# Patient Record
Sex: FEMALE | Race: WHITE | Employment: UNEMPLOYED | URBAN - METROPOLITAN AREA
[De-identification: names, ages, dates, MRNs, and addresses within clinical notes are randomized per-mention and may not be internally consistent; named-entity substitution may affect disease eponyms.]

---

## 2018-11-23 ENCOUNTER — HOSPITAL ENCOUNTER (OUTPATIENT)
Age: 58
Discharge: HOME | End: 2018-11-23
Attending: INTERNAL MEDICINE | Admitting: INTERNAL MEDICINE
Payer: COMMERCIAL

## 2018-11-23 VITALS
HEIGHT: 66 IN | RESPIRATION RATE: 18 BRPM | HEART RATE: 61 BPM | DIASTOLIC BLOOD PRESSURE: 57 MMHG | WEIGHT: 283 LBS | BODY MASS INDEX: 45.48 KG/M2 | SYSTOLIC BLOOD PRESSURE: 107 MMHG | TEMPERATURE: 95.5 F | OXYGEN SATURATION: 95 %

## 2018-11-23 DIAGNOSIS — R94.39 ABNORMAL STRESS TEST: ICD-10-CM

## 2018-11-23 LAB
ABO/RH: NORMAL
ANTIBODY SCREEN: NORMAL

## 2018-11-23 PROCEDURE — 86850 RBC ANTIBODY SCREEN: CPT

## 2018-11-23 PROCEDURE — 2580000003 HC RX 258: Performed by: INTERNAL MEDICINE

## 2018-11-23 PROCEDURE — 86901 BLOOD TYPING SEROLOGIC RH(D): CPT

## 2018-11-23 PROCEDURE — 6370000000 HC RX 637 (ALT 250 FOR IP)

## 2018-11-23 PROCEDURE — C1887 CATHETER, GUIDING: HCPCS

## 2018-11-23 PROCEDURE — 2709999900 HC NON-CHARGEABLE SUPPLY

## 2018-11-23 PROCEDURE — C1894 INTRO/SHEATH, NON-LASER: HCPCS

## 2018-11-23 PROCEDURE — 93458 L HRT ARTERY/VENTRICLE ANGIO: CPT | Performed by: INTERNAL MEDICINE

## 2018-11-23 PROCEDURE — 6360000002 HC RX W HCPCS

## 2018-11-23 PROCEDURE — C1769 GUIDE WIRE: HCPCS

## 2018-11-23 PROCEDURE — 2500000003 HC RX 250 WO HCPCS

## 2018-11-23 PROCEDURE — 86900 BLOOD TYPING SEROLOGIC ABO: CPT

## 2018-11-23 PROCEDURE — 36415 COLL VENOUS BLD VENIPUNCTURE: CPT

## 2018-11-23 RX ORDER — POTASSIUM CHLORIDE 1.5 G/1.77G
40 POWDER, FOR SOLUTION ORAL 2 TIMES DAILY
COMMUNITY
End: 2019-03-05 | Stop reason: SDUPTHER

## 2018-11-23 RX ORDER — IBUPROFEN 800 MG/1
800 TABLET ORAL EVERY 6 HOURS PRN
COMMUNITY
End: 2019-06-13 | Stop reason: ALTCHOICE

## 2018-11-23 RX ORDER — VENLAFAXINE 75 MG/1
75 TABLET ORAL 3 TIMES DAILY
COMMUNITY
End: 2019-03-04 | Stop reason: DRUGHIGH

## 2018-11-23 RX ORDER — FENOFIBRATE 160 MG/1
160 TABLET ORAL DAILY
COMMUNITY

## 2018-11-23 RX ORDER — DILTIAZEM HYDROCHLORIDE 180 MG/1
180 CAPSULE, EXTENDED RELEASE ORAL DAILY
COMMUNITY
End: 2019-03-05 | Stop reason: SDUPTHER

## 2018-11-23 RX ORDER — PROMETHAZINE HYDROCHLORIDE 25 MG/1
25 TABLET ORAL EVERY 6 HOURS PRN
COMMUNITY
End: 2020-10-13

## 2018-11-23 RX ORDER — OMEPRAZOLE 20 MG/1
20 CAPSULE, DELAYED RELEASE ORAL DAILY
COMMUNITY
End: 2020-01-21 | Stop reason: DRUGHIGH

## 2018-11-23 RX ORDER — SODIUM CHLORIDE 9 MG/ML
INJECTION, SOLUTION INTRAVENOUS CONTINUOUS
Status: DISCONTINUED | OUTPATIENT
Start: 2018-11-23 | End: 2018-11-23 | Stop reason: HOSPADM

## 2018-11-23 RX ORDER — CLONIDINE HYDROCHLORIDE 0.2 MG/1
0.2 TABLET ORAL DAILY
COMMUNITY
End: 2020-10-13 | Stop reason: ALTCHOICE

## 2018-11-23 RX ORDER — DIGOXIN 250 MCG
250 TABLET ORAL DAILY
COMMUNITY

## 2018-11-23 RX ORDER — GABAPENTIN 600 MG/1
600 TABLET ORAL 3 TIMES DAILY
COMMUNITY

## 2018-11-23 RX ORDER — FUROSEMIDE 40 MG/1
40 TABLET ORAL DAILY
COMMUNITY
End: 2019-06-13 | Stop reason: SDUPTHER

## 2018-11-23 RX ORDER — BUPROPION HYDROCHLORIDE 150 MG/1
150 TABLET ORAL EVERY MORNING
COMMUNITY

## 2018-11-23 RX ORDER — AMOXICILLIN AND CLAVULANATE POTASSIUM 875; 125 MG/1; MG/1
1 TABLET, FILM COATED ORAL 2 TIMES DAILY
Qty: 14 TABLET | Refills: 0 | Status: SHIPPED | OUTPATIENT
Start: 2018-11-23 | End: 2018-11-30

## 2018-11-23 RX ADMIN — SODIUM CHLORIDE: 9 INJECTION, SOLUTION INTRAVENOUS at 07:53

## 2018-11-23 ASSESSMENT — PAIN SCALES - GENERAL
PAINLEVEL_OUTOF10: 0
PAINLEVEL_OUTOF10: 0

## 2018-11-23 NOTE — H&P
12,000 Units by mouth 2 times daily   Yes Historical Provider, MD   metFORMIN (GLUCOPHAGE) 1000 MG tablet Take 1,000 mg by mouth 2 times daily (with meals)   Yes Historical Provider, MD   omeprazole (PRILOSEC) 20 MG delayed release capsule Take 20 mg by mouth daily   Yes Historical Provider, MD   potassium chloride (KLOR-CON) 20 MEQ packet Take 40 mEq by mouth 2 times daily   Yes Historical Provider, MD   promethazine (PHENERGAN) 25 MG tablet Take 25 mg by mouth every 6 hours as needed for Nausea   Yes Historical Provider, MD   venlafaxine (EFFEXOR) 75 MG tablet Take 75 mg by mouth 3 times daily   Yes Historical Provider, MD   buPROPion (WELLBUTRIN XL) 150 MG extended release tablet Take 150 mg by mouth every morning   Yes Historical Provider, MD   digoxin (LANOXIN) 250 MCG tablet Take 250 mcg by mouth daily   Yes Historical Provider, MD        IP Meds:       Allergies: Latex    Social:  reports that she has never smoked. She has never used smokeless tobacco.     FH: family history is not on file. Review of Systems:  HEENT: negative for acute visual and auditory problems  Constitutional: negative for fever and chills  Respiratory: negative for cough and hemoptysis  Cardiovascular: as per HPI  Gastrointestinal: negative for abdominal pain, diarrhea, nausea and vomiting  Genitourinary: negative for dysuria and hematuria  Derm: negative for rash and skin lesion(s)  Neurological: negative for seizures and tremors  Endocrine: negative for diabetic symptoms including polydipsia and polyuria  Musculoskeletal: negative for CTD  Psychiatric: negative for anxiety and major depression      Physical Exam:  BP (!) 107/57   Pulse 61   Temp 95.5 °F (35.3 °C) (Temporal)   Resp 18   Ht 5' 6\" (1.676 m)   Wt 283 lb (128.4 kg)   SpO2 95%   BMI 45.68 kg/m²    CONST: In general, this is a well developed, middle aged female who appears of stated age. Awake, alert, cooperative, no apparent distress.  Throat: Oral mucosa pink and moist.  HEENT: Head- normocephalic, atraumatic; Eyes:conjunctivae pink, no noted xanthomas. Neck: no stridor, no noted enlargement of the thyroid,symmetric, no tenderness, no jugular venous distention. No carotid bruit noted. CHEST: Symmetrical and non-tender to palpation. No noted accessory muscle use or intercostal retractions. LUNGS: normal AE b/l; no creps; no ronchi. CARDIOVASCULAR:   Heart Inspection shows no noted pulsations  Heart Palpation: no heaves or thrills, PMI in 5th ISC near left midclavicular line   Heart Ausculation -Normal S1 and S2, RRR, no murmur, s3, s4 or rub noted. PV: no extremity edema. No varicosities. Pedal pulses palpable, no clubbing or cyanosis   ABDOMEN: Soft, non-tender to light palpation. Bowel sounds present. No palpable masses no hepatosplenomegaly or splenomegaly; no abdominal bruit / pulsation  MS: Good muscle strength and tone. Not atrophy or abnormal movements. : deferred. SKIN: Warm and dry no statis dermatitis or ulcers. NEURO / PSYCH: Oriented to person, place and time. Speech clear and appropriate. Follows all commands. Pleasant affect. Assessment:   1. CP and abnormal stress test. Reviewed the reports from [facility]. Will proceed with cath. Plan:  1. NPO  2. Cath today  3. Rationale and risks benefits were reviewed with the patient in detail.  She is willing to proceed

## 2019-03-01 PROBLEM — I49.9 ARRHYTHMIA: Status: ACTIVE | Noted: 2019-03-01

## 2019-03-01 PROBLEM — E11.9 DIABETES MELLITUS (HCC): Status: ACTIVE | Noted: 2019-03-01

## 2019-03-01 PROBLEM — E78.5 HYPERLIPIDEMIA: Status: ACTIVE | Noted: 2019-03-01

## 2019-03-01 PROBLEM — J44.9 COPD (CHRONIC OBSTRUCTIVE PULMONARY DISEASE) (HCC): Status: ACTIVE | Noted: 2019-03-01

## 2019-03-01 PROBLEM — I10 HYPERTENSION: Status: ACTIVE | Noted: 2019-03-01

## 2019-03-01 RX ORDER — MELOXICAM 15 MG/1
TABLET ORAL
Refills: 3 | COMMUNITY
Start: 2018-12-27

## 2019-03-01 RX ORDER — PANCRELIPASE 60000; 12000; 38000 [USP'U]/1; [USP'U]/1; [USP'U]/1
CAPSULE, DELAYED RELEASE PELLETS ORAL
Refills: 3 | COMMUNITY
Start: 2018-12-27

## 2019-03-01 RX ORDER — DILTIAZEM HYDROCHLORIDE 180 MG/1
CAPSULE, COATED, EXTENDED RELEASE ORAL
Refills: 3 | COMMUNITY
Start: 2018-12-27

## 2019-03-01 RX ORDER — ERGOCALCIFEROL 1.25 MG/1
CAPSULE ORAL
Refills: 3 | COMMUNITY
Start: 2018-12-27

## 2019-03-01 RX ORDER — FLUTICASONE PROPIONATE 50 MCG
SPRAY, SUSPENSION (ML) NASAL
Refills: 3 | COMMUNITY
Start: 2018-12-27

## 2019-03-01 RX ORDER — VENLAFAXINE HYDROCHLORIDE 75 MG/1
CAPSULE, EXTENDED RELEASE ORAL
Refills: 3 | COMMUNITY
Start: 2018-12-27 | End: 2019-03-04 | Stop reason: DRUGHIGH

## 2019-03-01 RX ORDER — POTASSIUM CHLORIDE 20 MEQ/1
TABLET, EXTENDED RELEASE ORAL
Refills: 3 | COMMUNITY
Start: 2018-12-27

## 2019-03-01 RX ORDER — GLIMEPIRIDE 4 MG/1
TABLET ORAL
COMMUNITY
Start: 2019-01-07

## 2019-03-04 RX ORDER — VENLAFAXINE HYDROCHLORIDE 150 MG/1
CAPSULE, EXTENDED RELEASE ORAL
Refills: 0 | COMMUNITY
Start: 2018-12-17

## 2019-03-05 ENCOUNTER — OFFICE VISIT (OUTPATIENT)
Dept: CARDIOLOGY CLINIC | Age: 59
End: 2019-03-05
Payer: COMMERCIAL

## 2019-03-05 VITALS
RESPIRATION RATE: 18 BRPM | WEIGHT: 286 LBS | BODY MASS INDEX: 47.65 KG/M2 | HEART RATE: 79 BPM | HEIGHT: 65 IN | DIASTOLIC BLOOD PRESSURE: 84 MMHG | SYSTOLIC BLOOD PRESSURE: 138 MMHG

## 2019-03-05 DIAGNOSIS — E11.9 TYPE 2 DIABETES MELLITUS WITHOUT COMPLICATION, WITHOUT LONG-TERM CURRENT USE OF INSULIN (HCC): ICD-10-CM

## 2019-03-05 DIAGNOSIS — I10 ESSENTIAL HYPERTENSION: Primary | ICD-10-CM

## 2019-03-05 DIAGNOSIS — R06.09 EXERTIONAL DYSPNEA: ICD-10-CM

## 2019-03-05 LAB
ALBUMIN: 3.9 GM/DL (ref 3.1–4.5)
ALP BLD-CCNC: 72 U/L (ref 45–117)
ALT SERPL-CCNC: 29 U/L (ref 12–78)
AST SERPL-CCNC: 23 IU/L (ref 3–35)
BILIRUB SERPL-MCNC: 0.3 MG/DL (ref 0.2–1)
BUN BLDV-MCNC: 21 MG/DL (ref 7–24)
CALCIUM SERPL-MCNC: 9.3 MG/DL (ref 8.5–10.5)
CHLORIDE BLD-SCNC: 105 MMOL/L (ref 98–107)
CO2: 30 MMOL/L (ref 21–32)
CREAT SERPL-MCNC: 0.98 MG/DL (ref 0.55–1.02)
GFR AFRICAN AMERICAN: > 60 ML/MIN
GFR SERPL CREATININE-BSD FRML MDRD: 58 ML/MIN/
GLUCOSE: 124 MG/DL (ref 65–99)
POTASSIUM SERPL-SCNC: 4.5 MMOL/L (ref 3.5–5.1)
SODIUM BLD-SCNC: 138 MMOL/L (ref 136–145)
TOTAL PROTEIN: 8 GM/DL (ref 6.4–8.2)

## 2019-03-05 PROCEDURE — G8484 FLU IMMUNIZE NO ADMIN: HCPCS | Performed by: INTERNAL MEDICINE

## 2019-03-05 PROCEDURE — 1036F TOBACCO NON-USER: CPT | Performed by: INTERNAL MEDICINE

## 2019-03-05 PROCEDURE — 3046F HEMOGLOBIN A1C LEVEL >9.0%: CPT | Performed by: INTERNAL MEDICINE

## 2019-03-05 PROCEDURE — G8417 CALC BMI ABV UP PARAM F/U: HCPCS | Performed by: INTERNAL MEDICINE

## 2019-03-05 PROCEDURE — 99214 OFFICE O/P EST MOD 30 MIN: CPT | Performed by: INTERNAL MEDICINE

## 2019-03-05 PROCEDURE — 3017F COLORECTAL CA SCREEN DOC REV: CPT | Performed by: INTERNAL MEDICINE

## 2019-03-05 PROCEDURE — 93000 ELECTROCARDIOGRAM COMPLETE: CPT | Performed by: INTERNAL MEDICINE

## 2019-03-05 PROCEDURE — 2022F DILAT RTA XM EVC RTNOPTHY: CPT | Performed by: INTERNAL MEDICINE

## 2019-03-05 PROCEDURE — G8427 DOCREV CUR MEDS BY ELIG CLIN: HCPCS | Performed by: INTERNAL MEDICINE

## 2019-03-05 RX ORDER — LISINOPRIL 5 MG/1
10 TABLET ORAL DAILY
Status: SHIPPED | OUTPATIENT
Start: 2019-03-05 | End: 2019-04-04

## 2019-03-05 RX ORDER — ISOSORBIDE MONONITRATE 30 MG/1
30 TABLET, EXTENDED RELEASE ORAL DAILY
Qty: 30 TABLET | Refills: 3 | Status: SHIPPED | OUTPATIENT
Start: 2019-03-05

## 2019-03-05 RX ORDER — ATORVASTATIN CALCIUM 20 MG
20 TABLET ORAL DAILY
Qty: 30 TABLET | Refills: 3
Start: 2019-03-05 | End: 2020-03-17 | Stop reason: ALTCHOICE

## 2019-03-05 SDOH — HEALTH STABILITY: MENTAL HEALTH: HOW OFTEN DO YOU HAVE A DRINK CONTAINING ALCOHOL?: NEVER

## 2019-06-13 ENCOUNTER — OFFICE VISIT (OUTPATIENT)
Dept: CARDIOLOGY CLINIC | Age: 59
End: 2019-06-13
Payer: COMMERCIAL

## 2019-06-13 VITALS
WEIGHT: 286 LBS | SYSTOLIC BLOOD PRESSURE: 128 MMHG | HEIGHT: 65 IN | DIASTOLIC BLOOD PRESSURE: 86 MMHG | BODY MASS INDEX: 47.65 KG/M2 | HEART RATE: 53 BPM | RESPIRATION RATE: 18 BRPM

## 2019-06-13 DIAGNOSIS — I10 HYPERTENSION, UNSPECIFIED TYPE: Primary | ICD-10-CM

## 2019-06-13 PROCEDURE — G8427 DOCREV CUR MEDS BY ELIG CLIN: HCPCS | Performed by: INTERNAL MEDICINE

## 2019-06-13 PROCEDURE — 99214 OFFICE O/P EST MOD 30 MIN: CPT | Performed by: INTERNAL MEDICINE

## 2019-06-13 PROCEDURE — G8417 CALC BMI ABV UP PARAM F/U: HCPCS | Performed by: INTERNAL MEDICINE

## 2019-06-13 PROCEDURE — 93000 ELECTROCARDIOGRAM COMPLETE: CPT | Performed by: INTERNAL MEDICINE

## 2019-06-13 PROCEDURE — 1036F TOBACCO NON-USER: CPT | Performed by: INTERNAL MEDICINE

## 2019-06-13 PROCEDURE — 3017F COLORECTAL CA SCREEN DOC REV: CPT | Performed by: INTERNAL MEDICINE

## 2019-06-13 RX ORDER — HYDROCODONE BITARTRATE AND ACETAMINOPHEN 5; 325 MG/1; MG/1
1 TABLET ORAL EVERY 6 HOURS PRN
Refills: 0 | COMMUNITY
Start: 2019-05-07 | End: 2019-06-13 | Stop reason: ALTCHOICE

## 2019-06-13 RX ORDER — FUROSEMIDE 40 MG/1
40 TABLET ORAL DAILY
Qty: 30 TABLET | Refills: 11 | Status: SHIPPED | OUTPATIENT
Start: 2019-06-13

## 2019-06-13 NOTE — PROGRESS NOTES
OUTPATIENT CARDIOLOGY FOLLOW-UP    Name: Milton Diaz    Age: 60 y.o. Primary Care Physician: Tanvi House MD    Date of Service: 6/13/2019    Chief Complaint:   Chief Complaint   Patient presents with    3 Month Follow-Up     Tightness in chest, SOB       Interim History:   Mr. Avila Jackman is a 58-year-old female with a history of for type II diabetes, paroxysmal atrial fibrillation on Xeralto for anticoagulation, GERD, hypertension, hyperlipidemia currently on statin,  COPD, and history of morbid obesity with a possible sleep apnea was initially seen as a new consult in the hospital 11/20/2018 for evaluation of chest pain. She underwent pharmacological stress test with imaging. She had a abnormal stress test with evidence of anterior and inferior wall ischemia. Subsequently, she underwent cardiac cath on 11/23/2018 which showed no significant coronary artery disease. She never smoked in her life but was exposed to secondhand smoking. She was discharged from the hospital on 11/24/2018 following cardiac cath. She still complaining of intermittent exertional chest pain and subsides with a sublingual nitroglycerin. She also has been having exertional dyspnea and sometimes with a dizziness without any leg edema or paroxysmal nocturnal dyspnea. However, she does have orthopnea with occasional paroxysmal nocturnal dyspnea. She gets winded if she walks more than 100 feet. She has been watching her diet and did not gain or lose weight since her last visit. He is compliant with medications, as well as salt and fluid intake. He does not take any over-the-counter arthritis medications. She was seen in the office on 3/5/2019. Since her last visit, she has not had any ER visits or hospitalizations for cardiac problems. No new cardiac complaints since last cardiology evaluation. She denies recent palpitations, lightheadedness, dizziness, syncope, PND, or orthopnea. SR on EKG.     Review of Systems:   Cardiac: As per Not on file     Attends meetings of clubs or organizations: Not on file     Relationship status: Not on file    Intimate partner violence:     Fear of current or ex partner: Not on file     Emotionally abused: Not on file     Physically abused: Not on file     Forced sexual activity: Not on file   Other Topics Concern    Not on file   Social History Narrative    Not on file       Allergies: Allergies   Allergen Reactions    Latex        Current Medications:  Current Outpatient Medications   Medication Sig Dispense Refill    isosorbide mononitrate (IMDUR) 30 MG extended release tablet Take 1 tablet by mouth daily 30 tablet 3    LIPITOR 20 MG tablet Take 1 tablet by mouth daily 30 tablet 3    venlafaxine (EFFEXOR XR) 150 MG extended release capsule TAKE ONE CAPSULE BY MOUTH DAILY   DOSE INCREASE   EFFEXOR XR  0    diltiazem (CARDIZEM CD) 180 MG extended release capsule TAKE ONE CAPSULE BY MOUTH DAILY  3    vitamin D (ERGOCALCIFEROL) 54121 units CAPS capsule TAKE ONE CAPSULE EVERY WEEK  3    fluticasone (FLONASE) 50 MCG/ACT nasal spray USE 2 SPRAYS IN EACH NOSTRIL EVERY DAY FLONASE  3    glimepiride (AMARYL) 4 MG tablet       meloxicam (MOBIC) 15 MG tablet TAKE 1 TABLET BY MOUTH DAILY MOBIC  3    metoprolol tartrate (LOPRESSOR) 25 MG tablet TAKE 1 TABLET BY MOUTH DAILY LOPRESSOR  3    CREON 10198 units delayed release capsule TAKE ONE CAPSULE EVERY 12 HOURS  3    potassium chloride (KLOR-CON M) 20 MEQ extended release tablet TAKE 1 TABLET BY MOUTH DAILY  3    apixaban (ELIQUIS) 5 MG TABS tablet Take by mouth 2 times daily      cloNIDine (CATAPRES) 0.2 MG tablet Take 0.2 mg by mouth daily      furosemide (LASIX) 40 MG tablet Take 40 mg by mouth daily      fenofibrate 160 MG tablet Take 160 mg by mouth daily      gabapentin (NEURONTIN) 600 MG tablet Take 600 mg by mouth 3 times daily. Augusto Rodriguez ibuprofen (ADVIL;MOTRIN) 800 MG tablet Take 800 mg by mouth every 6 hours as needed for Pain      AMYLASE-LIPASE-PROTEASE PO Take 12,000 Units by mouth 2 times daily      metFORMIN (GLUCOPHAGE) 1000 MG tablet Take 1,000 mg by mouth 2 times daily (with meals)      omeprazole (PRILOSEC) 20 MG delayed release capsule Take 20 mg by mouth daily      buPROPion (WELLBUTRIN XL) 150 MG extended release tablet Take 150 mg by mouth every morning      digoxin (LANOXIN) 250 MCG tablet Take 250 mcg by mouth daily      HYDROcodone-acetaminophen (NORCO) 5-325 MG per tablet Take 1 tablet by mouth every 6 hours as needed. 0    promethazine (PHENERGAN) 25 MG tablet Take 25 mg by mouth every 6 hours as needed for Nausea       No current facility-administered medications for this visit. Physical Exam:  /86   Pulse 53   Resp 18   Ht 5' 5\" (1.651 m)   Wt 286 lb (129.7 kg)   BMI 47.59 kg/m²   Wt Readings from Last 3 Encounters:   06/13/19 286 lb (129.7 kg)   03/05/19 286 lb (129.7 kg)   11/23/18 283 lb (128.4 kg)     Appearance: Awake, alert and oriented x 3, no acute respiratory distress, she morbidly obese  Skin: Intact, no rash  Head: Normocephalic, atraumatic  Eyes: EOMI, no conjunctival erythema  ENMT: No pharyngeal erythema, MMM, no rhinorrhea  Neck: Supple, no elevated JVP, no carotid bruits  Lungs: Clear to auscultation bilaterally. No wheezes, rales, or rhonchi.   Cardiac: Regular rate and rhythm, +S1S2, no murmurs apparent  Abdomen: Soft, nontender, +bowel sounds  Extremities: Moves all extremities x 4, no lower extremity edema  Neurologic: No focal motor deficits apparent, normal mood and affect, alert and oriented x 3  Peripheral Pulses: Intact posterior tibial pulses bilaterally    Laboratory Tests:  Lab Results   Component Value Date    CREATININE 0.98 03/05/2019    BUN 21 03/05/2019     03/05/2019    K 4.5 03/05/2019     03/05/2019    CO2 30 03/05/2019     No results found for: MG  No results found for: WBC, HGB, HCT, MCV, PLT  Lab Results   Component Value Date    ALT 29 03/05/2019 AST 23 03/05/2019    ALKPHOS 72 03/05/2019    BILITOT 0.3 03/05/2019     No results found for: CKTOTAL, CKMB, CKMBINDEX, TROPONINI  No results found for: INR, PROTIME  No results found for: TSHFT4, TSH  No results found for: LABA1C  No results found for: EAG  No results found for: CHOL  No results found for: TRIG  No results found for: HDL  No results found for: LDLCALC, LDLCHOLESTEROL  No results found for: LABVLDL, VLDL  No results found for: CHOLHDLRATIO    Cardiac Tests:  ECG: Sinus bradycardia, otherwise normal EKG. Echocardiogram: 11/21/2018-LVEF 60-65%, concentric remodeling, grade 2 diastolic dysfunction, no significant valvular heart disease, normal PA systolic pressure of 32 mmHg. Stress test:  11/21/2018: Abnormal study, there is evidence of ischemia of the anterior and inferior walls. There is a large sized area of moderately reduced uptake in the mid to apical segment of the anterolateral and anteroseptal walls which is seen on the stress images and improves on the resting images. There is also small area of mildly decreased uptake in the inferior wall suggestive ischemia. Cardiac catheterization: 11/23/22018  Findings:  Left main: short stenosis  LAD: normal. 0 stenosis  Circumflex: 0 % stenosis  RCA: Dominant. Mild proximal disease. LV angio: not performed.      Hemodynamics:  LV: 124/13(13) mmHg. No gradient across AV. Ao: [id number]).      The ASCVD Risk score (Opal Delgado, et al., 2013) failed to calculate for the following reasons:    Cannot find a previous HDL lab    Cannot find a previous total cholesterol lab        ASSESSMENT:  1. 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 2. Diastolic dysfunction with exertional dyspnea. 3. Hyperlipidemia not well controlled  4. Hypertension not well controlled. 5. Type 2 DM  6. H/o PAF   7.  Morbid obesity with possible sleep apnea.    Plan:   1. She remained again to get a sleep study to rule out sleep apnea. 2. Continue  on Imdur 30 mg po daily for chest pain and Lisinopril 10 mg po daily for hypertension  3. Continue Atorvastatin 20 mg po daily due to type 2 DM  4. Will get lipid profile results from [facility]   5. She was advised to lose weight and exercise on a regular basis  6. Follow up with me in 6 months. The patient's current medication list, allergies, problem list and results of all previously ordered testing were reviewed at today's visit.   Catrachito Colon MD  [facility]) Cardiology

## 2020-01-21 RX ORDER — LORATADINE 10 MG/1
1 TABLET ORAL DAILY
COMMUNITY
Start: 2019-11-18

## 2020-01-21 RX ORDER — OMEPRAZOLE 40 MG/1
1 CAPSULE, DELAYED RELEASE ORAL DAILY
COMMUNITY
Start: 2020-01-18

## 2020-03-17 ENCOUNTER — OFFICE VISIT (OUTPATIENT)
Dept: CARDIOLOGY CLINIC | Age: 60
End: 2020-03-17
Payer: MEDICARE

## 2020-03-17 VITALS
HEART RATE: 81 BPM | DIASTOLIC BLOOD PRESSURE: 96 MMHG | WEIGHT: 253 LBS | SYSTOLIC BLOOD PRESSURE: 142 MMHG | RESPIRATION RATE: 18 BRPM | HEIGHT: 65 IN | BODY MASS INDEX: 42.15 KG/M2

## 2020-03-17 PROCEDURE — 3017F COLORECTAL CA SCREEN DOC REV: CPT | Performed by: INTERNAL MEDICINE

## 2020-03-17 PROCEDURE — G8427 DOCREV CUR MEDS BY ELIG CLIN: HCPCS | Performed by: INTERNAL MEDICINE

## 2020-03-17 PROCEDURE — 93000 ELECTROCARDIOGRAM COMPLETE: CPT | Performed by: INTERNAL MEDICINE

## 2020-03-17 PROCEDURE — G8417 CALC BMI ABV UP PARAM F/U: HCPCS | Performed by: INTERNAL MEDICINE

## 2020-03-17 PROCEDURE — 99214 OFFICE O/P EST MOD 30 MIN: CPT | Performed by: INTERNAL MEDICINE

## 2020-03-17 PROCEDURE — G8484 FLU IMMUNIZE NO ADMIN: HCPCS | Performed by: INTERNAL MEDICINE

## 2020-03-17 PROCEDURE — 1036F TOBACCO NON-USER: CPT | Performed by: INTERNAL MEDICINE

## 2020-03-17 RX ORDER — LINAGLIPTIN 5 MG/1
TABLET, FILM COATED ORAL
COMMUNITY
Start: 2020-01-30 | End: 2020-10-13

## 2020-03-17 RX ORDER — CARVEDILOL 6.25 MG/1
6.25 TABLET ORAL 2 TIMES DAILY WITH MEALS
Qty: 60 TABLET | Refills: 3 | Status: SHIPPED | OUTPATIENT
Start: 2020-03-17

## 2020-03-17 RX ORDER — ATORVASTATIN CALCIUM 40 MG/1
40 TABLET, FILM COATED ORAL DAILY
Qty: 90 TABLET | Refills: 1 | Status: SHIPPED
Start: 2020-03-17 | End: 2020-10-13 | Stop reason: SDUPTHER

## 2020-03-17 RX ORDER — ASPIRIN 81 MG/1
81 TABLET ORAL DAILY
Qty: 90 TABLET | Refills: 1 | Status: SHIPPED | OUTPATIENT
Start: 2020-03-17

## 2020-03-17 NOTE — PROGRESS NOTES
OUTPATIENT CARDIOLOGY FOLLOW-UP    Name: Femi Jara    Age: 60 y.o. Primary Care Physician: Nelida Wood MD    Date of Service: 3/17/2020    Chief Complaint:   Chief Complaint   Patient presents with    Follow-up     SOB, Sweating, Achy chest       Interim History:   Mr. Gracie Maier is a 59-year-old female with a history of for type II diabetes, paroxysmal atrial fibrillation on Xeralto for anticoagulation, GERD, hypertension, hyperlipidemia currently on statin,  COPD, and history of morbid obesity with a possible sleep apnea was initially seen as a new consult in the hospital 11/20/2018 for evaluation of chest pain. She underwent pharmacological stress test with imaging. She had a abnormal stress test with evidence of anterior and inferior wall ischemia. Subsequently, she underwent cardiac cath on 11/23/2018 which showed no significant coronary artery disease. She never smoked in her life but was exposed to secondhand smoking. She was discharged from the hospital on 11/24/2018 following cardiac cath. She still complaining of intermittent exertional chest pain and subsides with a sublingual nitroglycerin. She also has been having exertional dyspnea and sometimes with a dizziness without any leg edema or paroxysmal nocturnal dyspnea. However, she does have orthopnea with occasional paroxysmal nocturnal dyspnea. She gets winded if she walks more than 100 feet. She has been watching her diet and did not gain or lose weight since her last visit. He is compliant with medications, as well as salt and fluid intake. He does not take any over-the-counter arthritis medications. She was seen in the office on 6/13/2019. Since her last visit, she has not had any ER visits or hospitalizations for cardiac problems. She lost 33 pounds since her last visit. She has been having mid sternal heaviness off and on for the past couple weeks mainly with exertion. Pain gets better with rest and pain is associated with dyspnea.  Social connections     Talks on phone: Not on file     Gets together: Not on file     Attends Nondenominational service: Not on file     Active member of club or organization: Not on file     Attends meetings of clubs or organizations: Not on file     Relationship status: Not on file    Intimate partner violence     Fear of current or ex partner: Not on file     Emotionally abused: Not on file     Physically abused: Not on file     Forced sexual activity: Not on file   Other Topics Concern    Not on file   Social History Narrative    Not on file       Allergies:   Allergies   Allergen Reactions    Latex        Current Medications:  Current Outpatient Medications   Medication Sig Dispense Refill    omeprazole (PRILOSEC) 40 MG delayed release capsule Take 1 capsule by mouth daily      VENTOLIN  (90 Base) MCG/ACT inhaler       loratadine (CLARITIN) 10 MG tablet Take 1 tablet by mouth daily      furosemide (LASIX) 40 MG tablet Take 1 tablet by mouth daily 30 tablet 11    isosorbide mononitrate (IMDUR) 30 MG extended release tablet Take 1 tablet by mouth daily 30 tablet 3    LIPITOR 20 MG tablet Take 1 tablet by mouth daily 30 tablet 3    venlafaxine (EFFEXOR XR) 150 MG extended release capsule TAKE ONE CAPSULE BY MOUTH DAILY   DOSE INCREASE   EFFEXOR XR  0    diltiazem (CARDIZEM CD) 180 MG extended release capsule TAKE ONE CAPSULE BY MOUTH DAILY  3    vitamin D (ERGOCALCIFEROL) 75664 units CAPS capsule TAKE ONE CAPSULE EVERY WEEK  3    fluticasone (FLONASE) 50 MCG/ACT nasal spray USE 2 SPRAYS IN EACH NOSTRIL EVERY DAY FLONASE  3    glimepiride (AMARYL) 4 MG tablet       meloxicam (MOBIC) 15 MG tablet TAKE 1 TABLET BY MOUTH DAILY MOBIC  3    metoprolol tartrate (LOPRESSOR) 25 MG tablet TAKE 1 TABLET BY MOUTH DAILY LOPRESSOR  3    CREON 15371 units delayed release capsule TAKE ONE CAPSULE EVERY 12 HOURS  3    potassium chloride (KLOR-CON M) 20 MEQ extended release tablet TAKE 1 TABLET BY MOUTH DAILY 3    apixaban (ELIQUIS) 5 MG TABS tablet Take by mouth 2 times daily      cloNIDine (CATAPRES) 0.2 MG tablet Take 0.2 mg by mouth daily      fenofibrate 160 MG tablet Take 160 mg by mouth daily      gabapentin (NEURONTIN) 600 MG tablet Take 600 mg by mouth 3 times daily. Tyrone Barber AMYLASE-LIPASE-PROTEASE PO Take 12,000 Units by mouth 2 times daily      metFORMIN (GLUCOPHAGE) 1000 MG tablet Take 1,000 mg by mouth 2 times daily (with meals)      promethazine (PHENERGAN) 25 MG tablet Take 25 mg by mouth every 6 hours as needed for Nausea      buPROPion (WELLBUTRIN XL) 150 MG extended release tablet Take 150 mg by mouth every morning      digoxin (LANOXIN) 250 MCG tablet Take 250 mcg by mouth daily       No current facility-administered medications for this visit. Physical Exam:  Resp 18   Ht 5' 5\" (1.651 m)   Wt 253 lb (114.8 kg)   BMI 42.10 kg/m²   Wt Readings from Last 3 Encounters:   03/17/20 253 lb (114.8 kg)   06/13/19 286 lb (129.7 kg)   03/05/19 286 lb (129.7 kg)     Appearance: Awake, alert and oriented x 3, no acute respiratory distress, she morbidly obese  Skin: Intact, no rash  Head: Normocephalic, atraumatic  Eyes: EOMI, no conjunctival erythema  ENMT: No pharyngeal erythema, MMM, no rhinorrhea  Neck: Supple, no elevated JVP, no carotid bruits  Lungs: Clear to auscultation bilaterally. No wheezes, rales, or rhonchi.   Cardiac: Regular rate and rhythm, +S1S2, no murmurs apparent  Abdomen: Soft, nontender, +bowel sounds  Extremities: Moves all extremities x 4, no lower extremity edema  Neurologic: No focal motor deficits apparent, normal mood and affect, alert and oriented x 3  Peripheral Pulses: Intact posterior tibial pulses bilaterally    Laboratory Tests:  Lab Results   Component Value Date    CREATININE 0.98 03/05/2019    BUN 21 03/05/2019     03/05/2019    K 4.5 03/05/2019     03/05/2019    CO2 30 03/05/2019     No results found for: MG  No results found for: WBC, HGB, HCT, coronary microcirculation disease. Will optimize medical therapy, still having pain mostly from Syndrome X    2. Diastolic dysfunction with exertional dyspnea. 3. Hyperlipidemia not well controlled  4. Hypertension not well controlled. 5. Type 2 DM  6. H/o PAF   7. Morbid obesity with possible sleep apnea. Continue diet control and was advised to lose more weight. Plan:   1. She remained again to get a sleep study to rule out sleep apnea. 2. Continue  on Imdur 30 mg po daily for chest pain and Lisinopril 10 mg po daily for hypertension  3. Start on  Atorvastatin 40 mg po daily due to type 2 DM and start aspirin 81 mg PO daily. 4. Lipid panel results were reviewed with the patient and advised to start on statin. 5. Will change metoprolol to Coreg 6.25 mg po twice a day due to chest pain and uncontrolled hypertension. 6. She was advised to lose weight and exercise on a regular basis  7. Follow up with me in one  month. The patient's current medication list, allergies, problem list and results of all previously ordered testing were reviewed at today's visit.   Carmelo Gonzalez MD  [facility]) Cardiology

## 2020-03-17 NOTE — PATIENT INSTRUCTIONS
1. She remained again to get a sleep study to rule out sleep apnea. 2. Continue  on Imdur 30 mg po daily for chest pain and Lisinopril 10 mg po daily for hypertension  3. Start on  Atorvastatin 40 mg po daily due to type 2 DM and start aspirin 81 mg PO daily. 4. Lipid panel results were reviewed with the patient and advised to start on statin. 5. Will change metoprolol to Coreg 6.25 mg po twice a day due to chest pain and uncontrolled hypertension. 6. She was advised to lose weight and exercise on a regular basis  7. Follow up with me in one  month.

## 2020-06-30 RX ORDER — BUSPIRONE HYDROCHLORIDE 5 MG/1
1 TABLET ORAL DAILY
COMMUNITY
Start: 2020-05-22

## 2020-06-30 RX ORDER — METFORMIN HYDROCHLORIDE 500 MG/1
1 TABLET, EXTENDED RELEASE ORAL
COMMUNITY
Start: 2020-06-02

## 2020-10-13 ENCOUNTER — OFFICE VISIT (OUTPATIENT)
Dept: CARDIOLOGY CLINIC | Age: 60
End: 2020-10-13
Payer: MEDICARE

## 2020-10-13 VITALS
RESPIRATION RATE: 20 BRPM | DIASTOLIC BLOOD PRESSURE: 86 MMHG | SYSTOLIC BLOOD PRESSURE: 132 MMHG | WEIGHT: 258 LBS | HEIGHT: 65 IN | HEART RATE: 66 BPM | BODY MASS INDEX: 42.99 KG/M2

## 2020-10-13 PROCEDURE — 93005 ELECTROCARDIOGRAM TRACING: CPT | Performed by: INTERNAL MEDICINE

## 2020-10-13 PROCEDURE — 99214 OFFICE O/P EST MOD 30 MIN: CPT | Performed by: INTERNAL MEDICINE

## 2020-10-13 PROCEDURE — 3017F COLORECTAL CA SCREEN DOC REV: CPT | Performed by: INTERNAL MEDICINE

## 2020-10-13 PROCEDURE — 1036F TOBACCO NON-USER: CPT | Performed by: INTERNAL MEDICINE

## 2020-10-13 PROCEDURE — G8417 CALC BMI ABV UP PARAM F/U: HCPCS | Performed by: INTERNAL MEDICINE

## 2020-10-13 PROCEDURE — G8427 DOCREV CUR MEDS BY ELIG CLIN: HCPCS | Performed by: INTERNAL MEDICINE

## 2020-10-13 PROCEDURE — G8484 FLU IMMUNIZE NO ADMIN: HCPCS | Performed by: INTERNAL MEDICINE

## 2020-10-13 RX ORDER — ATORVASTATIN CALCIUM 40 MG/1
40 TABLET, FILM COATED ORAL DAILY
Qty: 90 TABLET | Refills: 3 | Status: SHIPPED | OUTPATIENT
Start: 2020-10-13

## 2020-10-13 NOTE — PROGRESS NOTES
OUTPATIENT CARDIOLOGY FOLLOW-UP    Name: Jyoti Mccarty    Age: 60 y.o. Primary Care Physician: Josefa Castillo MD    Date of Service: 10/13/2020    Chief Complaint:   Chief Complaint   Patient presents with    Follow-up     Cardiac Clearance for knee surgery, SOB on exertion       Interim History:   Mr. MARADIAGA is a 69-year-old female with a history of for type II diabetes, paroxysmal atrial fibrillation on Xeralto for anticoagulation, GERD, hypertension, hyperlipidemia currently on statin,  COPD, and history of morbid obesity with a possible sleep apnea was initially seen as a new consult in the hospital 11/20/2018 for evaluation of chest pain. She underwent pharmacological stress test with imaging. She had a abnormal stress test with evidence of anterior and inferior wall ischemia. Subsequently, she underwent cardiac cath on 11/23/2018 which showed no significant coronary artery disease. She never smoked in her life but was exposed to secondhand smoking. She was discharged from the hospital on 11/24/2018 following cardiac cath. She still complaining of intermittent exertional chest pain and subsides with a sublingual nitroglycerin. She also has been having exertional dyspnea and sometimes with a dizziness without any leg edema or paroxysmal nocturnal dyspnea. However, she does have orthopnea with occasional paroxysmal nocturnal dyspnea. She gets winded if she walks more than 100 feet. She has been watching her diet and did not gain or lose weight since her last visit. He is compliant with medications, as well as salt and fluid intake. He does not take any over-the-counter arthritis medications. She was seen in the office on 3/17/2020. Since her last visit, she was hospitalized in June 2020 with atypical chest.  She underwent a Lexiscan nuclear stress test which showed no evidence of ischemia or scar with normal LV function. Patient was discharged home. Denies any further ER visits or hospitalizations. She is scheduled to undergo left knee replacement on 11/10/2020 with Dr. Jameson Landau. She was recently admitted to the hospital in June 2020 with a atypical chest pain and underwent stress test which came back normal without any scars or ischemia. LV function was normal.  She still has exertional dyspnea without any orthopnea or PND. Also has some leg edema. She denies recent palpitations, lightheadedness, dizziness, syncope, PND, or orthopnea. SR on EKG. Review of Systems:   Cardiac: As per HPI  General: No fever, chills  Pulmonary: As per HPI  HEENT: No visual disturbances, difficult swallowing  GI: No nausea, vomiting  Endocrine: No thyroid disease or DM  Musculoskeletal: SANDS x 4, no focal motor deficits  Skin: Intact, no rashes  Neuro/Psych: No headache or seizures    Past Medical History:  Past Medical History:   Diagnosis Date    Arthritis     Atrial fibrillation (HCC)     [facility]     COPD [facility]chronic obstructive pulmonary disease) [facility]     Depression     Diabetes mellitus ([facility]     GERD (gastroesophageal reflux disease)     Hyperlipidemia     Hypertension     Obesity        Past Surgical History:  Past Surgical History:   Procedure Laterality Date    CARDIAC CATHETERIZATION  11/23/2018    Dr. Sierra Denney         Family History:  Family History   Problem Relation Age of Onset    Hypertension Mother        Social History:  Social History     Socioeconomic History    Marital status:       Spouse name: Not on file    Number of children: Not on file    Years of education: Not on file    Highest education level: Not on file   Occupational History    Not on file   Social Needs    Financial resource strain: Not on file    Food insecurity     Worry: Not on file     Inability: Not on file    Transportation needs     Medical: Not on file     Non-medical: Not on file   Tobacco Use    Smoking status: Never Smoker    Smokeless tobacco: Never Used   Substance and Sexual Activity    Alcohol use: Never     Frequency: Never    Drug use: Never    Sexual activity: Not on file   Lifestyle    Physical activity     Days per week: Not on file     Minutes per session: Not on file    Stress: Not on file   Relationships    Social connections     Talks on phone: Not on file     Gets together: Not on file     Attends [employer] service: Not on file     Active member of club or organization: Not on file     Attends meetings of clubs or organizations: Not on file     Relationship status: Not on file    Intimate partner violence     Fear of current or ex partner: Not on file     Emotionally abused: Not on file     Physically abused: Not on file     Forced sexual activity: Not on file   Other Topics Concern    Not on file   Social History Narrative    Not on file       Allergies:   Allergies   Allergen Reactions    Latex        Current Medications:  Current Outpatient Medications   Medication Sig Dispense Refill    metFORMIN (GLUCOPHAGE-XR) 500 MG extended release tablet Take 1 tablet by mouth daily (with breakfast)       busPIRone (BUSPAR) 5 MG tablet Take 1 tablet by mouth daily      TRADJENTA 5 MG tablet       carvedilol (COREG) 6.25 MG tablet Take 1 tablet by mouth 2 times daily (with meals) Stop metoprolol 60 tablet 3    atorvastatin (LIPITOR) 40 MG tablet Take 1 tablet by mouth daily 90 tablet 1    aspirin EC 81 MG EC tablet Take 1 tablet by mouth daily 90 tablet 1    omeprazole (PRILOSEC) 40 MG delayed release capsule Take 1 capsule by mouth daily      VENTOLIN  (90 Base) MCG/ACT inhaler       loratadine (CLARITIN) 10 MG tablet Take 1 tablet by mouth daily      furosemide (LASIX) 40 MG tablet Take 1 tablet by mouth daily 30 tablet 11    isosorbide mononitrate (IMDUR) 30 MG extended release tablet Take 1 tablet by mouth daily 30 tablet 3    venlafaxine (EFFEXOR XR) 150 MG extended release capsule TAKE ONE CAPSULE BY MOUTH DAILY   DOSE INCREASE   EFFEXOR XR  0    diltiazem (CARDIZEM CD) 180 MG extended release capsule TAKE ONE CAPSULE BY MOUTH DAILY  3    vitamin D (ERGOCALCIFEROL) 42036 units CAPS capsule TAKE ONE CAPSULE EVERY WEEK  3    fluticasone (FLONASE) 50 MCG/ACT nasal spray USE 2 SPRAYS IN EACH NOSTRIL EVERY DAY FLONASE  3    glimepiride (AMARYL) 4 MG tablet       meloxicam (MOBIC) 15 MG tablet TAKE 1 TABLET BY MOUTH DAILY MOBIC  3    CREON 38662 units delayed release capsule TAKE ONE CAPSULE EVERY 12 HOURS  3    potassium chloride (KLOR-CON M) 20 MEQ extended release tablet TAKE 1 TABLET BY MOUTH DAILY  3    apixaban (ELIQUIS) 5 MG TABS tablet Take by mouth 2 times daily      cloNIDine (CATAPRES) 0.2 MG tablet Take 0.2 mg by mouth daily      fenofibrate 160 MG tablet Take 160 mg by mouth daily      gabapentin (NEURONTIN) 600 MG tablet Take 600 mg by mouth 3 times daily. Quintella Luis Antonio AMYLASE-LIPASE-PROTEASE PO Take 12,000 Units by mouth 2 times daily      promethazine (PHENERGAN) 25 MG tablet Take 25 mg by mouth every 6 hours as needed for Nausea      buPROPion (WELLBUTRIN XL) 150 MG extended release tablet Take 150 mg by mouth every morning      digoxin (LANOXIN) 250 MCG tablet Take 250 mcg by mouth daily       No current facility-administered medications for this visit. Physical Exam:  Resp 20   Ht 5' 5\" (1.651 m)   Wt 258 lb (117 kg)   BMI 42.93 kg/m²   Wt Readings from Last 3 Encounters:   10/13/20 258 lb (117 kg)   03/17/20 253 lb (114.8 kg)   06/13/19 286 lb (129.7 kg)     Appearance: Awake, alert and oriented x 3, no acute respiratory distress, she morbidly obese. Skin: Intact, no rash  Head: Normocephalic, atraumatic  Eyes: EOMI, no conjunctival erythema  ENMT: No pharyngeal erythema, MMM, no rhinorrhea  Neck: Supple, no elevated JVP, no carotid bruits  Lungs: Clear to auscultation bilaterally. No wheezes, rales, or rhonchi.   Cardiac: Regular rate and rhythm, +S1S2, no murmurs apparent  Abdomen: Soft, nontender, +bowel sounds  Extremities: Moves all extremities x 4, no lower extremity edema  Neurologic: No focal motor deficits apparent, normal mood and affect, alert and oriented x 3  Peripheral Pulses: Intact posterior tibial pulses bilaterally    Laboratory Tests:  Lab Results   Component Value Date    CREATININE 0.98 03/05/2019    BUN 21 03/05/2019     03/05/2019    K 4.5 03/05/2019     03/05/2019    CO2 30 03/05/2019     No results found for: MG  No results found for: WBC, HGB, HCT, MCV, PLT  Lab Results   Component Value Date    ALT 29 03/05/2019    AST 23 03/05/2019    ALKPHOS 72 03/05/2019    BILITOT 0.3 03/05/2019     No results found for: CKTOTAL, CKMB, CKMBINDEX, TROPONINI  No results found for: INR, PROTIME  No results found for: TSHFT4, TSH  No results found for: LABA1C  No results found for: EAG  No results found for: CHOL  No results found for: TRIG  No results found for: HDL  No results found for: LDLCALC, LDLCHOLESTEROL  No results found for: LABVLDL, VLDL  No results found for: CHOLHDLRATIO    Cardiac Tests:  ECG: Sinus rhythm, otherwise normal EKG. No changes compared to prior eKG    Echocardiogram: 11/21/2018-LVEF 60-65%, concentric remodeling, grade 2 diastolic dysfunction, no significant valvular heart disease, normal PA systolic pressure of 32 mmHg. Stress test:  11/21/2018: Abnormal study, there is evidence of ischemia of the anterior and inferior walls. There is a large sized area of moderately reduced uptake in the mid to apical segment of the anterolateral and anteroseptal walls which is seen on the stress images and improves on the resting images. There is also small area of mildly decreased uptake in the inferior wall suggestive ischemia. Scan nuclear stress test done 6/1/2020: Normal study. No skin to graphic evidence for myocardial ischemia or scar. LVEF 72%. Normal wall motion.     Cardiac catheterization: 11/23/22018  Findings:  Left main: short stenosis  LAD: normal. 0 stenosis  Circumflex: 0 % stenosis  RCA: Dominant. Mild proximal disease. LV angio: not performed.      Hemodynamics:  LV: 124/13(13) mmHg. No gradient across AV. Ao: [id number]).      The ASCVD Risk score (Devin Andrade, et al., 2013) failed to calculate for the following reasons:    Cannot find a previous HDL lab    Cannot find a previous total cholesterol lab    Lipid panel-11/18/2019: Total cholesterol 198, HDL 44,  and triglycerides 200. Bun/creatinine 21/1.32 liver functions are normal, . ASSESSMENT:  1. Preop cardiac evaluation prior to knee surgery  2. Chronic stable chest pain with exertion and  normal coronaries on recent cath, her symptoms are most likely from coronary microcirculation disease. Again recent stress test in June 2020 was normal.    3. Chronic dyspnea multifactorial including COPD, morbid obesity and diastolic heart failure. 4. Diastolic dysfunction with exertional dyspnea. 5. Hyperlipidemia not well controlled  6. Hypertension not well controlled, did not take medications today   7. Type 2 DM on insulin  8. H/o PAF, now in normal sinus rhythm  9. Morbid obesity with possible sleep apnea. Continue diet control and was advised to lose more weight. Plan:   1. Her revised cardiac risk index is elevated (2) with 10.1% risk of perioperative cardiac events for 30 days. She does not have any active cardiac symptoms such as chest pain, digital heart failure, uncontrolled arrhythmias or obstructive valve lesions. She had a recent stress test in June 2020 which showed no ischemia or scar with normal LV function. She is on Eliquis for anticoagulation which I would recommend stopping at least 2 days prior to her knee surgery and restart 24 hours after the surgery if there is no concern for bleeding. No further cardiac work-up is planned at this time and should proceed with the surgery as scheduled.